# Patient Record
Sex: FEMALE | Race: WHITE | Employment: OTHER | URBAN - METROPOLITAN AREA
[De-identification: names, ages, dates, MRNs, and addresses within clinical notes are randomized per-mention and may not be internally consistent; named-entity substitution may affect disease eponyms.]

---

## 2018-07-04 ENCOUNTER — HOSPITAL ENCOUNTER (EMERGENCY)
Age: 83
Discharge: HOME | End: 2018-07-04
Attending: EMERGENCY MEDICINE
Payer: MEDICARE

## 2018-07-04 VITALS
WEIGHT: 130 LBS | SYSTOLIC BLOOD PRESSURE: 158 MMHG | BODY MASS INDEX: 24.55 KG/M2 | DIASTOLIC BLOOD PRESSURE: 60 MMHG | OXYGEN SATURATION: 98 % | RESPIRATION RATE: 20 BRPM | HEIGHT: 61 IN | TEMPERATURE: 98.2 F | HEART RATE: 88 BPM

## 2018-07-04 DIAGNOSIS — L51.9 ERYTHEMA MULTIFORME: Primary | ICD-10-CM

## 2018-07-04 PROCEDURE — 99283 EMERGENCY DEPT VISIT LOW MDM: CPT | Performed by: EMERGENCY MEDICINE

## 2018-07-04 RX ORDER — TRIAMCINOLONE ACETONIDE 0.25 MG/G
OINTMENT TOPICAL 2 TIMES DAILY
Qty: 30 G | Refills: 0 | Status: SHIPPED | OUTPATIENT
Start: 2018-07-04

## 2018-07-05 NOTE — ED NOTES
I have reviewed discharge instructions with the patient. The patient verbalized understanding. Patient left ED via Discharge Method: ambulatory to Home with (friend). Opportunity for questions and clarification provided. Patient given 1 scripts. To continue your aftercare when you leave the hospital, you may receive an automated call from our care team to check in on how you are doing. This is a free service and part of our promise to provide the best care and service to meet your aftercare needs.  If you have questions, or wish to unsubscribe from this service please call [phone]. Thank you for Choosing our [facility] Emergency Department.

## 2018-07-05 NOTE — DISCHARGE INSTRUCTIONS
Erythema Multiforme: Care Instructions  Your Care Instructions  Erythema multiforme (say \"air--THE-[facility]-FOR-shay\") is a rash that often causes red spots. These spots can look like targets, with a [place] around the edge and a dot in the middle. In most cases, doctors know the rash when they see it. But sometimes blood tests or testing a tissue sample (biopsy) can confirm what type of rash you have. Or these tests can help rule out other problems. This skin condition is usually found on the hands, feet, arms, or legs. But it can affect any part of the body. Sometimes, the rash itches or burns. Some people have a fever or feel a little sick. Doctors don't always know what causes erythema multiforme. But the rash may be related to a medicine, an infection, or another health problem. So your doctor may have you stop taking a certain medicine or treat you for a different problem. In many cases, the rash goes away on its own in a few weeks. But it can take longer. Some cases may need to be treated with medicines such as steroid medicines. The doctor has checked you carefully, but problems can develop later. If you notice any problems or new symptoms, get medical treatment right away. Follow-up care is a key part of your treatment and safety. Be sure to make and go to all appointments, and call your doctor if you are having problems. It's also a good idea to know your test results and keep a list of the medicines you take. How can you care for yourself at home? · Put a cool, moist cloth on the rash. · Be safe with medicines. Take your medicines exactly as prescribed. Call your doctor if you think you are having a problem with your medicine. When should you call for help? Call your doctor now or seek immediate medical care if:  ? · You have a new rash that affects your eyes, mouth, or genitals. ? · You have a fever or chills. ? Watch closely for changes in your health, and be sure to contact your doctor if:  ? · Your rash is changing or getting worse. ? · You do not get better as expected. Where can you learn more? Go to http://gabriel-gregorio.info/. Enter R657 in the search box to learn more about \"Erythema Multiforme: Care Instructions. \"  Current as of: October 13, 2016  Content Version: 11.4  © 0273-8165 [employer]. Care instructions adapted under license by [employer] (which disclaims liability or warranty for this information). If you have questions about a medical condition or this instruction, always ask your healthcare professional. [place] [address] any warranty or liability for your use of this information.

## 2018-07-05 NOTE — ED TRIAGE NOTES
Presents to Ed with multiple circular erythematous areas to legs bilaterally. Pt reports experiencing localized itching.

## 2018-07-05 NOTE — ED PROVIDER NOTES
Patient is a 81 y.o. female presenting with rash. The history is provided by the patient. Rash    This is a new problem. The current episode started more than 2 days ago. The problem has been gradually worsening. The problem is associated with an unknown factor. There has been no fever. The rash is present on the right lower leg and left lower leg. The patient is experiencing no pain. Associated symptoms include blisters and itching. Pertinent negatives include no pain, no weeping and no hives. Risk factors include new medication. She has tried nothing for the symptoms. The treatment provided no relief. Past Medical History:   Diagnosis Date    Arthritis     Hypertension     Ill-defined condition     hyperlipidemia       Past Surgical History:   Procedure Laterality Date    HX CHOLECYSTECTOMY      HX HYSTERECTOMY           No family history on file. Social History     Social History    Marital status:      Spouse name: N/A    Number of children: N/A    Years of education: N/A     Occupational History    Not on file. Social History Main Topics    Smoking status: Never Smoker    Smokeless tobacco: Never Used    Alcohol use No    Drug use: No    Sexual activity: Not on file     Other Topics Concern    Not on file     Social History Narrative    No narrative on file         ALLERGIES: Darvon [propoxyphene]    Review of Systems   Skin: Positive for itching and rash. All other systems reviewed and are negative. Vitals:    07/04/18 2038 07/04/18 2116 07/04/18 2124   BP: 162/67  158/60   Pulse: 98  88   Resp: 18  20   Temp: 98.5 °F (36.9 °C)  98.2 °F (36.8 °C)   SpO2: 98% 98% 98%   Weight: 59 kg (130 lb)     Height: 5' 1\" (1.549 m)              Physical Exam   Constitutional: She is oriented to person, place, and time. She appears well-developed and well-nourished. No distress. HENT:   Head: Normocephalic and atraumatic.    Mouth/Throat: Oropharynx is clear and moist. No oropharyngeal exudate. Eyes: Conjunctivae and EOM are normal. Pupils are equal, round, and reactive to light. Neurological: She is alert and oriented to person, place, and time. Skin: Skin is warm and dry. Rash noted. Patient has several erythematous macules to the left lower extremity 1 large 2 smaller and then a small erythematous macule on the right lower extremities are nonblanching large macular has a central bulla performed. There is no crusting noted and no warmth is noted to palpation   Psychiatric: She has a normal mood and affect. Her behavior is normal.   Nursing note and vitals reviewed.        MDM  Number of Diagnoses or Management Options  Erythema multiforme:   Risk of Complications, Morbidity, and/or Mortality  Presenting problems: low  Diagnostic procedures: minimal  Management options: low    Patient Progress  Patient progress: stable        ED Course       Procedures